# Patient Record
Sex: MALE | Race: OTHER | Employment: OTHER | URBAN - METROPOLITAN AREA
[De-identification: names, ages, dates, MRNs, and addresses within clinical notes are randomized per-mention and may not be internally consistent; named-entity substitution may affect disease eponyms.]

---

## 2020-11-24 NOTE — PATIENT DISCUSSION
Continue: PreserVision AREDS 2 (vit c,o-hj-vrqjt-lutein-zeaxan): capsule: 149-799-09-4 mg-unit-mg-mg 1 capsule twice a day as directed by mouth

## 2022-06-09 ENCOUNTER — NEW PATIENT (OUTPATIENT)
Dept: URBAN - METROPOLITAN AREA CLINIC 25 | Facility: CLINIC | Age: 67
End: 2022-06-09

## 2022-06-09 DIAGNOSIS — H52.203: ICD-10-CM

## 2022-06-09 DIAGNOSIS — H25.11: ICD-10-CM

## 2022-06-09 PROCEDURE — 92499RSE RETINAL SCREENING ELECTIVE

## 2022-06-09 PROCEDURE — 92004 COMPRE OPH EXAM NEW PT 1/>: CPT

## 2022-06-09 PROCEDURE — 92015 DETERMINE REFRACTIVE STATE: CPT

## 2022-06-09 ASSESSMENT — VISUAL ACUITY
OS_SC: 20/20
OD_SC: 20/20-1

## 2022-06-09 ASSESSMENT — KERATOMETRY
OS_AXISANGLE_DEGREES: 109
OD_K2POWER_DIOPTERS: 45.50
OS_AXISANGLE2_DEGREES: 19
OD_K1POWER_DIOPTERS: 44.50
OS_K2POWER_DIOPTERS: 45.25
OS_K1POWER_DIOPTERS: 44.75
OD_AXISANGLE_DEGREES: 52
OD_AXISANGLE2_DEGREES: 142

## 2022-06-09 ASSESSMENT — TONOMETRY
OD_IOP_MMHG: 10
OS_IOP_MMHG: 9